# Patient Record
Sex: MALE | Race: WHITE | URBAN - METROPOLITAN AREA
[De-identification: names, ages, dates, MRNs, and addresses within clinical notes are randomized per-mention and may not be internally consistent; named-entity substitution may affect disease eponyms.]

---

## 2017-07-14 ENCOUNTER — IMPORTED ENCOUNTER (OUTPATIENT)
Dept: URBAN - METROPOLITAN AREA CLINIC 50 | Facility: CLINIC | Age: 57
End: 2017-07-14

## 2017-08-04 ENCOUNTER — IMPORTED ENCOUNTER (OUTPATIENT)
Dept: URBAN - METROPOLITAN AREA CLINIC 50 | Facility: CLINIC | Age: 57
End: 2017-08-04

## 2017-08-18 ENCOUNTER — IMPORTED ENCOUNTER (OUTPATIENT)
Dept: URBAN - METROPOLITAN AREA CLINIC 50 | Facility: CLINIC | Age: 57
End: 2017-08-18

## 2017-09-29 ENCOUNTER — IMPORTED ENCOUNTER (OUTPATIENT)
Dept: URBAN - METROPOLITAN AREA CLINIC 50 | Facility: CLINIC | Age: 57
End: 2017-09-29

## 2017-10-04 ENCOUNTER — IMPORTED ENCOUNTER (OUTPATIENT)
Dept: URBAN - METROPOLITAN AREA CLINIC 50 | Facility: CLINIC | Age: 57
End: 2017-10-04

## 2017-10-04 NOTE — PATIENT DISCUSSION
"""S/P IOL OD: Sensar AAB00 27.0 +TM/Omidria. Continue post operative instructions and drops per schedule.  """

## 2017-10-13 ENCOUNTER — IMPORTED ENCOUNTER (OUTPATIENT)
Dept: URBAN - METROPOLITAN AREA CLINIC 50 | Facility: CLINIC | Age: 57
End: 2017-10-13

## 2017-10-18 ENCOUNTER — IMPORTED ENCOUNTER (OUTPATIENT)
Dept: URBAN - METROPOLITAN AREA CLINIC 50 | Facility: CLINIC | Age: 57
End: 2017-10-18

## 2017-10-18 NOTE — PATIENT DISCUSSION
"""S/P IOL OS: Sensar AAB00 27.5 +TM/Omidria. Continue post operative instructions and drops per schedule.  """

## 2017-10-27 ENCOUNTER — IMPORTED ENCOUNTER (OUTPATIENT)
Dept: URBAN - METROPOLITAN AREA CLINIC 50 | Facility: CLINIC | Age: 57
End: 2017-10-27

## 2017-11-17 ENCOUNTER — IMPORTED ENCOUNTER (OUTPATIENT)
Dept: URBAN - METROPOLITAN AREA CLINIC 50 | Facility: CLINIC | Age: 57
End: 2017-11-17

## 2019-06-05 ENCOUNTER — IMPORTED ENCOUNTER (OUTPATIENT)
Dept: URBAN - METROPOLITAN AREA CLINIC 50 | Facility: CLINIC | Age: 59
End: 2019-06-05

## 2021-04-17 ASSESSMENT — TONOMETRY
OS_IOP_MMHG: 12
OD_IOP_MMHG: 15
OS_IOP_MMHG: 16
OS_IOP_MMHG: 14
OD_IOP_MMHG: 14
OS_IOP_MMHG: 13
OD_IOP_MMHG: 16
OS_IOP_MMHG: 18
OD_IOP_MMHG: 24
OD_IOP_MMHG: 18
OD_IOP_MMHG: 13

## 2021-04-17 ASSESSMENT — VISUAL ACUITY
OS_PH: 20/70
OS_PH: 20/70+
OD_SC: 20/400
OS_CC: J7@ 17 IN
OS_PH: 20/100
OD_PH: 20/70
OD_PH: 20/40
OD_SC: 20/50+
OS_CC: J16
OD_SC: 20/50
OD_CC: J16
OS_SC: 20/40
OD_CC: 20/200+
OD_CC: 20/200
OS_SC: 20/100
OS_PH: 20/30-1
OS_CC: 20/100
OD_CC: J7@ 17 IN
OS_CC: 20/80
OS_SC: 20/60
OD_SC: 20/80
OS_CC: 20/50+-

## 2022-05-17 NOTE — PATIENT DISCUSSION
"""Annual Type 1 Diabetic eye exam with dilation. Mild diabetic retinopathy found.  Bilateral Continue metoprolol tartrate 100mg BID #60. Update on pulse and BP in about 1 week.